# Patient Record
Sex: FEMALE | Race: WHITE | NOT HISPANIC OR LATINO | Employment: FULL TIME | URBAN - METROPOLITAN AREA
[De-identification: names, ages, dates, MRNs, and addresses within clinical notes are randomized per-mention and may not be internally consistent; named-entity substitution may affect disease eponyms.]

---

## 2017-04-26 ENCOUNTER — OFFICE VISIT (OUTPATIENT)
Dept: FAMILY MEDICINE CLINIC | Facility: CLINIC | Age: 52
End: 2017-04-26

## 2017-04-26 VITALS
WEIGHT: 147 LBS | HEART RATE: 72 BPM | BODY MASS INDEX: 28.86 KG/M2 | OXYGEN SATURATION: 98 % | SYSTOLIC BLOOD PRESSURE: 115 MMHG | HEIGHT: 60 IN | DIASTOLIC BLOOD PRESSURE: 80 MMHG

## 2017-04-26 DIAGNOSIS — M54.42 ACUTE LEFT-SIDED LOW BACK PAIN WITH LEFT-SIDED SCIATICA: Primary | ICD-10-CM

## 2017-04-26 PROCEDURE — 99213 OFFICE O/P EST LOW 20 MIN: CPT | Performed by: FAMILY MEDICINE

## 2017-04-26 RX ORDER — METHYLPREDNISOLONE 4 MG/1
TABLET ORAL
Qty: 21 TABLET | Refills: 0 | Status: SHIPPED | OUTPATIENT
Start: 2017-04-26

## 2017-04-26 NOTE — PROGRESS NOTES
Subjective   Heike Rosales is a 51 y.o. female here due to back pain for 1MO.    History of Present Illness   Heike states she was heavy lifting some boxes with trixie when her pain developed. She described her pain as shooting pain down the back of her thigh. She states her pain worsens when laying in specific directions and moving in and out of her car.She has had pain for 3 weeks. She has tried ibuprofen and gets some relief. She has pain in left lower back and hip and pain that goes alt the way down to her foot.    The following portions of the patient's history were reviewed and updated as appropriate: allergies, current medications, past medical history and past social history.    Review of Systems   Musculoskeletal: Positive for back pain.   All other systems reviewed and are negative.      Objective   Physical Exam   Constitutional: She is oriented to person, place, and time. She appears well-developed and well-nourished. No distress.   HENT:   Head: Normocephalic and atraumatic.   Eyes: EOM are normal. Pupils are equal, round, and reactive to light.   Cardiovascular: Normal rate and regular rhythm.    Pulmonary/Chest: Effort normal and breath sounds normal.   Musculoskeletal:        Lumbar back: She exhibits decreased range of motion, tenderness and spasm. She exhibits no swelling, no edema and no deformity.   Pain increases when rising from a chair, getting into car. Pain with straight leg raising on affected side.   Neurological: She is alert and oriented to person, place, and time.   Skin: Skin is warm and dry. No rash noted.   Nursing note and vitals reviewed.      Assessment/Plan   Heike was seen today for back pain.    Diagnoses and all orders for this visit:    Acute left-sided low back pain with left-sided sciatica  -     MethylPREDNISolone (MEDROL, CHELSEA,) 4 MG tablet; Take as directed on package instructions.  -     Ambulatory Referral to Physical Therapy Evaluate and treat

## 2017-04-26 NOTE — PATIENT INSTRUCTIONS
I have started you on a steroid angélica, take with food!  I have referred you to PT.   Please call if not improving or you get worse.

## 2017-09-26 ENCOUNTER — APPOINTMENT (OUTPATIENT)
Dept: WOMENS IMAGING | Facility: HOSPITAL | Age: 52
End: 2017-09-26

## 2017-09-26 PROCEDURE — G0202 SCR MAMMO BI INCL CAD: HCPCS | Performed by: RADIOLOGY

## 2017-09-26 PROCEDURE — 77067 SCR MAMMO BI INCL CAD: CPT | Performed by: RADIOLOGY

## 2018-10-24 ENCOUNTER — APPOINTMENT (OUTPATIENT)
Dept: WOMENS IMAGING | Facility: HOSPITAL | Age: 53
End: 2018-10-24

## 2018-10-24 PROCEDURE — 77067 SCR MAMMO BI INCL CAD: CPT | Performed by: RADIOLOGY

## 2019-11-06 ENCOUNTER — APPOINTMENT (OUTPATIENT)
Dept: WOMENS IMAGING | Facility: HOSPITAL | Age: 54
End: 2019-11-06

## 2019-11-06 PROCEDURE — 77067 SCR MAMMO BI INCL CAD: CPT | Performed by: RADIOLOGY

## 2020-11-11 ENCOUNTER — APPOINTMENT (OUTPATIENT)
Dept: WOMENS IMAGING | Facility: HOSPITAL | Age: 55
End: 2020-11-11

## 2020-11-11 PROCEDURE — 77067 SCR MAMMO BI INCL CAD: CPT | Performed by: RADIOLOGY

## 2022-02-22 ENCOUNTER — APPOINTMENT (OUTPATIENT)
Dept: WOMENS IMAGING | Facility: HOSPITAL | Age: 57
End: 2022-02-22

## 2022-02-22 PROCEDURE — 77063 BREAST TOMOSYNTHESIS BI: CPT | Performed by: RADIOLOGY

## 2022-02-22 PROCEDURE — 77067 SCR MAMMO BI INCL CAD: CPT | Performed by: RADIOLOGY
